# Patient Record
Sex: FEMALE | Race: WHITE
[De-identification: names, ages, dates, MRNs, and addresses within clinical notes are randomized per-mention and may not be internally consistent; named-entity substitution may affect disease eponyms.]

---

## 2019-03-27 ENCOUNTER — HOSPITAL ENCOUNTER (INPATIENT)
Dept: HOSPITAL 89 - OR | Age: 54
LOS: 8 days | Discharge: HOME HEALTH | DRG: 460 | End: 2019-04-04
Attending: ORTHOPAEDIC SURGERY | Admitting: ORTHOPAEDIC SURGERY
Payer: OTHER GOVERNMENT

## 2019-03-27 VITALS — SYSTOLIC BLOOD PRESSURE: 70 MMHG | DIASTOLIC BLOOD PRESSURE: 39 MMHG

## 2019-03-27 VITALS — DIASTOLIC BLOOD PRESSURE: 63 MMHG | SYSTOLIC BLOOD PRESSURE: 84 MMHG

## 2019-03-27 VITALS — DIASTOLIC BLOOD PRESSURE: 56 MMHG | SYSTOLIC BLOOD PRESSURE: 87 MMHG

## 2019-03-27 VITALS — DIASTOLIC BLOOD PRESSURE: 68 MMHG | SYSTOLIC BLOOD PRESSURE: 102 MMHG

## 2019-03-27 VITALS
WEIGHT: 215 LBS | BODY MASS INDEX: 42.21 KG/M2 | HEIGHT: 60 IN | HEIGHT: 60 IN | WEIGHT: 215 LBS | BODY MASS INDEX: 42.21 KG/M2

## 2019-03-27 VITALS — SYSTOLIC BLOOD PRESSURE: 107 MMHG | DIASTOLIC BLOOD PRESSURE: 73 MMHG

## 2019-03-27 VITALS — DIASTOLIC BLOOD PRESSURE: 73 MMHG | SYSTOLIC BLOOD PRESSURE: 105 MMHG

## 2019-03-27 VITALS — SYSTOLIC BLOOD PRESSURE: 95 MMHG | DIASTOLIC BLOOD PRESSURE: 63 MMHG

## 2019-03-27 VITALS — DIASTOLIC BLOOD PRESSURE: 69 MMHG | SYSTOLIC BLOOD PRESSURE: 101 MMHG

## 2019-03-27 VITALS — DIASTOLIC BLOOD PRESSURE: 68 MMHG | SYSTOLIC BLOOD PRESSURE: 103 MMHG

## 2019-03-27 VITALS — SYSTOLIC BLOOD PRESSURE: 96 MMHG | DIASTOLIC BLOOD PRESSURE: 54 MMHG

## 2019-03-27 VITALS — DIASTOLIC BLOOD PRESSURE: 64 MMHG | SYSTOLIC BLOOD PRESSURE: 95 MMHG

## 2019-03-27 VITALS — DIASTOLIC BLOOD PRESSURE: 57 MMHG | SYSTOLIC BLOOD PRESSURE: 87 MMHG

## 2019-03-27 VITALS — SYSTOLIC BLOOD PRESSURE: 93 MMHG | DIASTOLIC BLOOD PRESSURE: 66 MMHG

## 2019-03-27 DIAGNOSIS — M48.062: Primary | ICD-10-CM

## 2019-03-27 DIAGNOSIS — E78.5: ICD-10-CM

## 2019-03-27 DIAGNOSIS — Z79.84: ICD-10-CM

## 2019-03-27 DIAGNOSIS — M54.17: ICD-10-CM

## 2019-03-27 DIAGNOSIS — G89.29: ICD-10-CM

## 2019-03-27 DIAGNOSIS — Z88.5: ICD-10-CM

## 2019-03-27 DIAGNOSIS — E66.01: ICD-10-CM

## 2019-03-27 DIAGNOSIS — R30.0: ICD-10-CM

## 2019-03-27 DIAGNOSIS — G25.81: ICD-10-CM

## 2019-03-27 DIAGNOSIS — I10: ICD-10-CM

## 2019-03-27 DIAGNOSIS — E11.9: ICD-10-CM

## 2019-03-27 DIAGNOSIS — M54.16: ICD-10-CM

## 2019-03-27 DIAGNOSIS — M48.07: ICD-10-CM

## 2019-03-27 PROCEDURE — 36415 COLL VENOUS BLD VENIPUNCTURE: CPT

## 2019-03-27 PROCEDURE — 97161 PT EVAL LOW COMPLEX 20 MIN: CPT

## 2019-03-27 PROCEDURE — 82247 BILIRUBIN TOTAL: CPT

## 2019-03-27 PROCEDURE — 84155 ASSAY OF PROTEIN SERUM: CPT

## 2019-03-27 PROCEDURE — 0SG1071 FUSION OF 2 OR MORE LUMBAR VERTEBRAL JOINTS WITH AUTOLOGOUS TISSUE SUBSTITUTE, POSTERIOR APPROACH, POSTERIOR COLUMN, OPEN APPROACH: ICD-10-PCS | Performed by: ORTHOPAEDIC SURGERY

## 2019-03-27 PROCEDURE — 82310 ASSAY OF CALCIUM: CPT

## 2019-03-27 PROCEDURE — 86850 RBC ANTIBODY SCREEN: CPT

## 2019-03-27 PROCEDURE — 85014 HEMATOCRIT: CPT

## 2019-03-27 PROCEDURE — 01NB0ZZ RELEASE LUMBAR NERVE, OPEN APPROACH: ICD-10-PCS | Performed by: ORTHOPAEDIC SURGERY

## 2019-03-27 PROCEDURE — 36416 COLLJ CAPILLARY BLOOD SPEC: CPT

## 2019-03-27 PROCEDURE — 84460 ALANINE AMINO (ALT) (SGPT): CPT

## 2019-03-27 PROCEDURE — 82374 ASSAY BLOOD CARBON DIOXIDE: CPT

## 2019-03-27 PROCEDURE — 87088 URINE BACTERIA CULTURE: CPT

## 2019-03-27 PROCEDURE — 84450 TRANSFERASE (AST) (SGOT): CPT

## 2019-03-27 PROCEDURE — 86900 BLOOD TYPING SEROLOGIC ABO: CPT

## 2019-03-27 PROCEDURE — 84520 ASSAY OF UREA NITROGEN: CPT

## 2019-03-27 PROCEDURE — 84132 ASSAY OF SERUM POTASSIUM: CPT

## 2019-03-27 PROCEDURE — 82040 ASSAY OF SERUM ALBUMIN: CPT

## 2019-03-27 PROCEDURE — 82948 REAGENT STRIP/BLOOD GLUCOSE: CPT

## 2019-03-27 PROCEDURE — 86901 BLOOD TYPING SEROLOGIC RH(D): CPT

## 2019-03-27 PROCEDURE — 84295 ASSAY OF SERUM SODIUM: CPT

## 2019-03-27 PROCEDURE — 82565 ASSAY OF CREATININE: CPT

## 2019-03-27 PROCEDURE — 95940 IONM IN OPERATNG ROOM 15 MIN: CPT

## 2019-03-27 PROCEDURE — 82435 ASSAY OF BLOOD CHLORIDE: CPT

## 2019-03-27 PROCEDURE — 82947 ASSAY GLUCOSE BLOOD QUANT: CPT

## 2019-03-27 PROCEDURE — 0SG3071 FUSION OF LUMBOSACRAL JOINT WITH AUTOLOGOUS TISSUE SUBSTITUTE, POSTERIOR APPROACH, POSTERIOR COLUMN, OPEN APPROACH: ICD-10-PCS | Performed by: ORTHOPAEDIC SURGERY

## 2019-03-27 PROCEDURE — 84075 ASSAY ALKALINE PHOSPHATASE: CPT

## 2019-03-27 PROCEDURE — 81001 URINALYSIS AUTO W/SCOPE: CPT

## 2019-03-27 PROCEDURE — 76000 FLUOROSCOPY <1 HR PHYS/QHP: CPT

## 2019-03-27 PROCEDURE — 85018 HEMOGLOBIN: CPT

## 2019-03-27 RX ADMIN — CEFAZOLIN SODIUM SCH MLS/HR: 2 SOLUTION INTRAVENOUS at 17:57

## 2019-03-27 RX ADMIN — HYDROCODONE BITARTRATE AND ACETAMINOPHEN PRN EACH: 5; 325 TABLET ORAL at 19:48

## 2019-03-27 RX ADMIN — OXYCODONE HYDROCHLORIDE PRN MG: 5 TABLET ORAL at 21:56

## 2019-03-27 RX ADMIN — OXYCODONE HYDROCHLORIDE PRN MG: 5 TABLET ORAL at 17:31

## 2019-03-27 RX ADMIN — HYDROMORPHONE HYDROCHLORIDE PRN MG: 2 INJECTION, SOLUTION INTRAMUSCULAR; INTRAVENOUS; SUBCUTANEOUS at 17:31

## 2019-03-27 RX ADMIN — ATORVASTATIN CALCIUM SCH MG: 40 TABLET, FILM COATED ORAL at 21:39

## 2019-03-27 RX ADMIN — CETIRIZINE HYDROCHLORIDE SCH MG: 10 TABLET, FILM COATED ORAL at 21:38

## 2019-03-27 RX ADMIN — DOCUSATE SODIUM SCH MG: 100 CAPSULE, LIQUID FILLED ORAL at 21:39

## 2019-03-27 RX ADMIN — AMITRIPTYLINE HYDROCHLORIDE SCH MG: 25 TABLET, FILM COATED ORAL at 21:39

## 2019-03-27 NOTE — OPERATIVE REPORT 1
EVENT DATE:  March 27, 2019 

SURGEON:  Reggie Miguel MD 

ANESTHESIOLOGIST:  Jin Guevara MD 

ANESTHESIA:  General endotracheal anesthesia.

ASSISTANT:  Kenn Ayers PA-C 





PREOPERATIVE DIAGNOSES  

1.  L2 to S1 spinal stenosis with flat back.

2.  Radiculopathy.

3.  Neurogenic claudication.



POSTOPERATIVE DIAGNOSES 

1.  L2 to S1 spinal stenosis with flat back.

2.  Radiculopathy.

3.  Neurogenic claudication.



PROCEDURE PERFORMED 

L2 to S1 revision laminectomy with L3 to S1 placement of pedicle screws and 

posterolateral instrumentation.  



INTRAVENOUS FLUIDS 

2400 mL.



ESTIMATED BLOOD LOSS 

200 mL.



IMPLANTS USED 

Reline pedicle screws 6.5 mm x 40 mm from NuVasive times eight, 90 mm connecting

rods from NuVasive times two, and locking caps from NuVasive times eight, plus 

ViBone 5 cc as a bone graft extender.  



SPECIMENS

None.



DRAINS

None.



COMPLICATIONS

None.



DISPOSITION

Post-anesthesia care unit.



INDICATIONS FOR SURGERY

Ms. Sanchez is a 53-year-old female who presented with the chief complaint of low 

back and radiating right leg pain.  The majority of her pain was below the belt,

radiating down the posterior buttock, posterolateral thigh, and lateral calf 

into the dorsum of the right foot.  She had weakness in both lower extremities 

and a significant decrease in walking tolerance secondary to heaviness and 

tiredness in her legs.  She had had previous surgery of the spine in the past.  

Her physical examination was significant for no extension of the spine beyond 

neutral, but flexion was to 90.  She had negative straight leg raise tests 

bilaterally, and lower extremity strength was 5/5 throughout with light touch 

sensation diminished in the saphenous nerve distributions and with dysesthesias 

in the deep peroneal nerve distribution on the right.  She had imaging studies 

that showed straightening of the lumbar lordosis with a slight anterolisthesis 

of L3 on L4 and L4 on L5.  She had a bit of a dextroconvex degenerative 

scoliosis centered on L2 and severe degenerative disk disease at L2-L3, L3-L4, 

and L4-L5 with severe stenosis at those levels.  Secondary to ongoing symptoms 

and failure of nonsurgical care, Ms. Sanchez was offered and elected to undergo 

revision lumbar laminectomy and posterolateral instrumented fusion.  We 

initially planned to perform interbody device placement at L3-L4, L4-L5, and L5-

S1 because of concern for potential soft bone, but in the end, this was not 

performed because her bone was, indeed, very hard, and the pedicle screw 

purchase was some of the best I have ever encountered.  



Prior to surgery, I explained in detail to the patient the possible risks of 

surgery.  These risks include bleeding, infection, damage to surrounding 

structures, nerve root injury, spinal fluid leak, meningitis, persistent and/or 

worsening pain, need for further surgery, death, blindness, sexual dysfunction, 

autonomic nervous system dysfunction, and other unforeseen medical and surgical 

complications.  An understanding that in general spinal surgery is more 

predictive at improving extremity discomfort than axial spine pain was stressed.

 Further understanding that given the revision nature of this procedure, 

potential risks were significantly increased was also stressed prior to surgery.

 



DESCRIPTION OF PROCEDURE

On the day of surgery, the patient was met in the preoperative hold area, and 

all questions were answered.  The operative site was identified and marked by 

myself.  The patient was brought in good condition to the operating room, and 

after succumbing to anesthesia, was positioned in the prone position on a 

Yo table.  All bony protuberances and soft tissues were well padded in the 

standard fashion.  Care was taken to maintain appropriate perfusion pressures 

during anesthesia.  Preoperative antibiotics were administered according to the 

appropriate timing schedule.  At the conclusion of the procedure, sponge and 

needle counts were correct times two.  



Final timeout was undertaken by members of the operating team to confirm correct

patient, correct levels, and correct surgery.  The patient was then prepped and 

draped in the standard sterile orthopedic fashion, and a vertical incision was 

made overlying the intended surgical levels.  Sharp dissection was carried down 

to the posterior elements, specifically the spinous process of L2 and the 

spinous process of L5.  Spinous processes of L3 and L4 were no longer present 

secondary to her previous spine surgery.  At the L2 and the L5 spinous 

processes, I was able to elevate soft tissues off those spinous processes and 

laminae in a subperiosteal manner.  I then used electrocautery,  directing my 

dissection away from the dura and out towards the facet joints and came down 

upon the facet joints of L3-L4, L4-L5, and L5-S1 with the electrocautery.  I was

then able to go over to the lateral aspect of those facets also and also at L2-

L3 and come down on the transverse processes of L3, L4, L5, as well as the 

sacral ala.  This was accomplished bilaterally, and soft tissues were elevated 

off the transverse processes out to the tips.  Bleeding was controlled, and 

thrombin-soaked sponges were packed in those lateral gutters to maintain space 

for later placement of screws and to assist with hemostasis.  



Attention was then turned to the decompression portion of the procedure.  I 

started with the L2 lamina and removed that with a Leksell rongeur and then 

thinned it down the midline with a high-speed elysia.  I was able to enter the 

canal by undermining the superior insertion of the scar tissue and reconstituted

ligamentum flavum from the inferior aspect of the L2 lamina.  I used a Alpaugh 

elevator to separate any dural adhesions from surrounding bone and soft tissue 

prior to using a Kerrison punch to complete a midline decompression of the L2 

lamina.  I then encountered significant scar trying to go inferiorly, and so I 

elected to try to attack the decompression from down below.  



I removed the L5 spinous process with a Leksell rongeur and then thinned the 

lamina with a high-speed elysia and the Leksell.  I was able to enter the canal 

inferiorly by undermining the superior insertion of the ligamentum flavum from 

the inferior aspect of the L5 lamina.  Again, I used a Monster elevator to 

separate any dural adhesions from surrounding bone and soft tissue prior to 

using the Kerrison punch.  I then carefully made my way from a caudad to 

cephalad direction, performing a midline decompression with a combination of #3 

and #4 Kerrison rongeurs as well as a Leksell rongeur to take down the pre-

existing scar tissue which was quite robust in the midline.  While doing this, I

noted that the laminae were actually present at both L3 and L4, and there was 

really no evidence of much of a decompressive procedure having been performed.  

Apart from the presence of the significant scar tissue and the absence of 

spinous processes, I was unable to completely ascertain exactly what surgery was

performed previously.  



Nonetheless, I was able to gradually work my way cephalad and ultimately was 

able to connect the inferior laminectomy defect with the laminectomy defect that

I had created by taking down the lamina of L2.  There was significant deviation 

to the dura at the L2-L3 and L3-L4 levels secondary to severely thickened 

ligamentum flavum and overgrown facet joints.  I gradually was able to free the 

dura up from surrounding bone and soft tissue and ultimately was able to perform

bilateral lateral recess decompressions again using #3 and #4 Kerrison rongeurs.

 Ultimately, I was able to free up all of the nerve roots, and a Monster 

elevator was passed in the lateral gutters to ensure that there was no 

significant persistent lateral recess stenosis.  The Alpaugh was also passed out

the involved formina, again noting no severe stenosis was present.  



Attention was then turned to placement of pedicle screws.  A 5 mm high-speed 

elysia was used to decorticate the bone overlying the starting point for pedicle 

screws bilaterally at L3, L4, L5, and S1.  The starting point was identified at 

approximately the junction of the pars interarticularis, the mid point of the 

transverse process, and the lateral aspect of the facet joint.  Once 

decortication was complete, a Lenke style probe was advanced against resistance 

through the isthmus of the pedicle and into the vertebral body.  A ball-tipped 

feeler was used to palpate the pedicle for any potential breeches, checking 

superiorly, inferiorly, medially, and laterally, as well as distally to ensure 

no bony breaching was present.  Pedicle screws 6.5 mm x 40 mm were selected for 

all eight pedicles, and the bony purchase was robust.  All screws were tested 

with neurophysiologic monitoring, and they all tested well within acceptable 

limits.  



Connecting rods 90 mm were selected, and the Portuguese bains was used to contour 

them to the appropriate lumbar lordosis.  These were laid into the tulips of the

pedicle screws and locked in place with locking caps, which were tightened and 

then finally tightened using the final tightening torque wrench.  AP and lateral

radiographs were obtained which showed excellent positioning of all screws and 

rods and restoration of the lumbar lordosis.  



The wound was then irrigated with 4 L of sterile saline solution, and we then 

used the high-speed elysia to decorticate the transverse processes of L3, L4, and 

L5, as well as the superior aspect of the sacral ala bilaterally.  We then 

packed a combination of ViBone and harvested local bone into those lateral 

gutters ensuring that we had bone overlying the transverse processes of L3, L4, 

L5, and over the decorticated sacral ala bilaterally.  Retractors were then 

removed, and hemostasis was obtained.  The wound was closed in layers using a 

running stitch for the deep fascia and inverted interrupted stitch for the 

subcutaneous tissue and then staples for the skin per the patient's request.  

Sponge and needle counts were correct times two.



POSTOPERATIVE CARE PLAN 

The patient will remain in the hospital until she meets discharge criteria.  She

will be discharged home with instructions to follow up in approximately two 

weeks for wound check and examination.  

TABITHA

## 2019-03-27 NOTE — RADIOLOGY IMAGING REPORT
FACILITY: [facility] 

 

PATIENT NAME: Kathia Sanchez

: 1965

MR: [id number]

V: [id number]

EXAM DATE: 

ORDERING PHYSICIAN: ARIAS JAY

TECHNOLOGIST: 

 

Location: [facility]

Patient: Kathia Sanchez

: 1965

MRN: [id number]

Visit/Account:[id number]

Date of Sevice:  3/27/2019

 

ACCESSION #: [id number]

 

C-ARM FLUORO 1 HR

 

HISTORY:  L3-S1, DISC HERNIATION/FUSION

 

COMPARISON:  None.

 

FINDINGS:

 

Three intraoperative fluoroscopic images demonstrate the placement of pedicle screws in L3-S1 vertebr
al bodies secured with rods.  Laminectomies involve at least L4 and L5.  Hardware appears appropriate
ly positioned.

 

Fluoroscopy time: 8.4 seconds

 

Dose:

DAP: 1.2 Gy-cm2

 

IMPRESSION:

 

Intraoperative PLIF L3-4 through L5-S1 unremarkable in appearance.

 

Report Dictated By: Danielito García MD at 3/27/2019 3:03 PM

 

Report E-Signed By: Danielito García MD  at 3/27/2019 3:06 PM

 

WSN:[id number]

## 2019-03-27 NOTE — HOSPITALIST CONSULTATION
History of Present Illness


Requesting Physician


Dr Miguel


Reason for Consult


Management of medical comorbidities


Chief Complaint


spinal stenosis


History of Present Illness


53F admitted after spinal surgery. PMHx significant for HTN, DM. Tolerated 


procedure well. Will resume home medications other than BP medication as BP is 


low.





History


Problems:  


(1) DM (diabetes mellitus)


(2) HTN (hypertension)


Home Meds


Reported Medications


Aspirin (ASPIR 81) 81 Mg Tablet.dr, 81 MG PO QPM, TAB


   3/27/19


Ferrous Sulfate, Dried (IRON) 160 Mg Tablet.er, 160 MG PO QDAY


   3/7/19


Calcium Carbonate (CALCIUM) 500 Mg Tablet, 500 MG PO QDAY


   3/7/19


Omega-3S/Dha/Epa/Fish Oil/D3 (FISH OIL + D3 SOFTGEL) 1 Each Capsule, 1 EACH PO 


QID, CAPSULE


   3/7/19


Exenatide Microspheres (Bydureon Pen) 2 Mg/0.65 Ml Pen.injctr, SQ QWEEK


   3/7/19


Insulin Glargine (LANTUS) 100 Unit/Ml Soln, 30 UNIT SUBQ BID, ML


   3/7/19


Amitriptyline Hcl (AMITRIPTYLINE HCL) 25 Mg Tablet, 25 MG PO BID, #5 TAB


   3/7/19


Cetirizine Hcl (ZYRTEC) 10 Mg Capsule, 10 MG PO HS, CAPSULE


   3/7/19


Lisinopril (LISINOPRIL) 20 Mg Tablet, 20 MG PO QDAY, TAB


   3/7/19


Pramipexole Di-Hcl (MIRAPEX) 1 Mg Tablet, 1 MG PO HS


   3/7/19


Spironolactone (SPIRONOLACTONE) 25 Mg Tablet, 25 MG PO QDAY, TAB


   3/7/19


Cyclobenzaprine Hcl (CYCLOBENZAPRINE HCL) 10 Mg Tablet, 10 MG PO HS, #9 TAB


   3/7/19


Propranolol Hcl (PROPRANOLOL HCL) 10 Mg Tablet, 10 MG PO BID


   3/7/19


Sitagliptin Phosphate (JANUVIA) 100 Mg Tablet, 100 MG PO QDAY


   3/7/19


Atorvastatin Calcium (LIPITOR) 40 Mg Tablet, 1 TAB PO HS, TAB


   3/7/19


Gabapentin (GABAPENTIN) 300 Mg Capsule, 600 MG PO TID, CAPSULE


   3/7/19


Metformin Hcl (METFORMIN HCL) 1,000 Mg Tablet, 1 TAB PO BID, TAB


   3/7/19


Discontinued Reported Medications


Cyanocobalamin/Folic Acid (VITAMIN B12-FOLIC ACID TABLET) 1 Each Tablet, 1 EACH 


PO QDAY


   3/7/19


Aspirin (ECOTRIN) 325 Mg Tablet.dr, 81 MG PO QDAY


   3/7/19


Allergies:  


Coded Allergies:  


     walnut (Verified  Allergy, Severe, ANAPHYLAXIS , 3/7/19)


     codeine (Verified  Adverse Reaction, Mild, PAIN IN UPPER STOMACH, 3/7/19)


     hydromorphone (Verified  Adverse Reaction, Mild, MIGRAINES , 3/7/19)


Patient History:  


Diabetes mellitus (DM)


  MOTHER


FHx: hypertension


  MOTHER


Hx Smoking:  No


Caffeine Intake:  Soda


Caffeine/Cups Per Day:  2-3 CANS/DAY


Hx Alcohol Use:  No


Social Drug Use:  Never


History of IV Drug Use:  No





Review of Systems


All Systems Reviewed/Normal:  Yes, Except as Noted


Gastrointestinal:  No Nausea, No Vomiting





Exam


Vital Signs





Vital Signs








  Date Time  Temp Pulse Resp B/P (MAP) Pulse Ox O2 Delivery O2 Flow Rate FiO2


 


3/27/19 19:30 96.8 82 14 95/64 (74) 96 Nasal Cannula 2.0 








General Appearance:  Alert, Awake, No Acute Distress, Afebrile


Neuro:  No Gross deficits


ENT:  Normal


Cardiovascular:  Normal Rhythm & Peripheral Pulses


Respiratory:  No Respiratory Distress


Extremities:  Soft and Non Tender, Warm, Pulses, Perfused


Integumentary:  Skin Intact without Lesion / Mass





Assessment and Plan


Problems:  


(1) Spinal stenosis


Assessment & Plan:  Post operative, management per primary.





(2) HTN (hypertension)


Assessment & Plan:  Hold home lisinopril, spironolactone. Resume if BP warrants.





(3) DM (diabetes mellitus)


Assessment & Plan:  Decrease insulin to 20U Lantus BID, accuchecks achs, SSI 


level 1. Hold metformin and Januvia while inpatient.








Venous Thromboembolism


Antithrombotics


Is Pt On Any Antithrombotics?:  Yes





Exam


Sepsis Risk:  No Definite Risk











HOGUE MANGO CORTES DO       Mar 27, 2019 21:06

## 2019-03-28 VITALS — DIASTOLIC BLOOD PRESSURE: 69 MMHG | SYSTOLIC BLOOD PRESSURE: 103 MMHG

## 2019-03-28 VITALS — SYSTOLIC BLOOD PRESSURE: 111 MMHG | DIASTOLIC BLOOD PRESSURE: 69 MMHG

## 2019-03-28 VITALS — SYSTOLIC BLOOD PRESSURE: 75 MMHG | DIASTOLIC BLOOD PRESSURE: 55 MMHG

## 2019-03-28 VITALS — DIASTOLIC BLOOD PRESSURE: 67 MMHG | SYSTOLIC BLOOD PRESSURE: 116 MMHG

## 2019-03-28 VITALS — SYSTOLIC BLOOD PRESSURE: 85 MMHG | DIASTOLIC BLOOD PRESSURE: 56 MMHG

## 2019-03-28 VITALS — SYSTOLIC BLOOD PRESSURE: 102 MMHG | DIASTOLIC BLOOD PRESSURE: 66 MMHG

## 2019-03-28 VITALS — DIASTOLIC BLOOD PRESSURE: 58 MMHG | SYSTOLIC BLOOD PRESSURE: 92 MMHG

## 2019-03-28 VITALS — SYSTOLIC BLOOD PRESSURE: 97 MMHG | DIASTOLIC BLOOD PRESSURE: 60 MMHG

## 2019-03-28 VITALS — SYSTOLIC BLOOD PRESSURE: 88 MMHG | DIASTOLIC BLOOD PRESSURE: 55 MMHG

## 2019-03-28 VITALS — SYSTOLIC BLOOD PRESSURE: 132 MMHG | DIASTOLIC BLOOD PRESSURE: 74 MMHG

## 2019-03-28 RX ADMIN — CEFAZOLIN SODIUM SCH MLS/HR: 2 SOLUTION INTRAVENOUS at 09:01

## 2019-03-28 RX ADMIN — DIAZEPAM PRN MG: 5 TABLET ORAL at 09:03

## 2019-03-28 RX ADMIN — AMITRIPTYLINE HYDROCHLORIDE SCH MG: 25 TABLET, FILM COATED ORAL at 09:03

## 2019-03-28 RX ADMIN — HYDROCODONE BITARTRATE AND ACETAMINOPHEN PRN EACH: 5; 325 TABLET ORAL at 05:31

## 2019-03-28 RX ADMIN — INSULIN GLARGINE SCH UNIT: 100 INJECTION, SOLUTION SUBCUTANEOUS at 21:45

## 2019-03-28 RX ADMIN — DIAZEPAM PRN MG: 5 TABLET ORAL at 16:21

## 2019-03-28 RX ADMIN — MAGNESIUM HYDROXIDE PRN ML: 400 SUSPENSION ORAL at 15:35

## 2019-03-28 RX ADMIN — INSULIN LISPRO PRN UNIT: 100 INJECTION, SOLUTION INTRAVENOUS; SUBCUTANEOUS at 17:51

## 2019-03-28 RX ADMIN — AMITRIPTYLINE HYDROCHLORIDE SCH MG: 25 TABLET, FILM COATED ORAL at 21:45

## 2019-03-28 RX ADMIN — OXYCODONE HYDROCHLORIDE PRN MG: 5 TABLET ORAL at 23:02

## 2019-03-28 RX ADMIN — OXYCODONE HYDROCHLORIDE PRN MG: 5 TABLET ORAL at 03:54

## 2019-03-28 RX ADMIN — OXYCODONE HYDROCHLORIDE PRN MG: 5 TABLET ORAL at 19:14

## 2019-03-28 RX ADMIN — HYDROCODONE BITARTRATE AND ACETAMINOPHEN PRN EACH: 5; 325 TABLET ORAL at 00:33

## 2019-03-28 RX ADMIN — PROPRANOLOL HYDROCHLORIDE SCH MG: 20 TABLET ORAL at 21:00

## 2019-03-28 RX ADMIN — DOCUSATE SODIUM SCH MG: 100 CAPSULE, LIQUID FILLED ORAL at 09:03

## 2019-03-28 RX ADMIN — ATORVASTATIN CALCIUM SCH MG: 40 TABLET, FILM COATED ORAL at 21:44

## 2019-03-28 RX ADMIN — HYDROMORPHONE HYDROCHLORIDE PRN MG: 2 INJECTION, SOLUTION INTRAMUSCULAR; INTRAVENOUS; SUBCUTANEOUS at 21:51

## 2019-03-28 RX ADMIN — CETIRIZINE HYDROCHLORIDE SCH MG: 10 TABLET, FILM COATED ORAL at 21:44

## 2019-03-28 RX ADMIN — HYDROMORPHONE HYDROCHLORIDE PRN MG: 2 INJECTION, SOLUTION INTRAMUSCULAR; INTRAVENOUS; SUBCUTANEOUS at 16:45

## 2019-03-28 RX ADMIN — CALCIUM SCH MG: 500 TABLET ORAL at 09:03

## 2019-03-28 RX ADMIN — CEFAZOLIN SODIUM SCH MLS/HR: 2 SOLUTION INTRAVENOUS at 00:33

## 2019-03-28 RX ADMIN — HYDROCODONE BITARTRATE AND ACETAMINOPHEN PRN EACH: 5; 325 TABLET ORAL at 17:17

## 2019-03-28 RX ADMIN — DOCUSATE SODIUM SCH MG: 100 CAPSULE, LIQUID FILLED ORAL at 21:44

## 2019-03-28 RX ADMIN — PRAMIPEXOLE DIHYDROCHLORIDE SCH MG: 0.25 TABLET ORAL at 21:44

## 2019-03-28 RX ADMIN — INSULIN LISPRO PRN UNIT: 100 INJECTION, SOLUTION INTRAVENOUS; SUBCUTANEOUS at 21:46

## 2019-03-28 RX ADMIN — HYDROCODONE BITARTRATE AND ACETAMINOPHEN PRN EACH: 5; 325 TABLET ORAL at 13:13

## 2019-03-28 RX ADMIN — INSULIN GLARGINE SCH UNIT: 100 INJECTION, SOLUTION SUBCUTANEOUS at 09:01

## 2019-03-28 RX ADMIN — HYDROCODONE BITARTRATE AND ACETAMINOPHEN PRN EACH: 5; 325 TABLET ORAL at 09:02

## 2019-03-28 RX ADMIN — HYDROCODONE BITARTRATE AND ACETAMINOPHEN PRN EACH: 5; 325 TABLET ORAL at 21:17

## 2019-03-28 NOTE — NUR
Physical Therapy Impression



PT eval complete. Pt reports pain and dysfunction of the L) LE at rest. PT

instruction for

bed mobility and log roll technique, pt requires moderate

assistance to move L) LE and move hips to the EOB. Once sitting at EOB, pt

reported lightheadedness. Pt's BP was 81/42 when supine, this dropped to

72/54 when seated at EOB with pt reporting symptoms. RN informed of pt's

tolerance to PT session. D/t dysfunction of the L) LE and high level of

independence needed to d/c home, rec acute rehab when medically

appropriate.





Physical Therapy Goals



1: Pt to complete bed mobility wtih SBA

2: Pt to complete transfers with SBA and appropriate AD

3: Pt to ambulate 150' with SBA and appropriate AD

4: Pt to asc/desc 6 stairs with railing and SBA

5: Pt to I)ly derrek/doff brace.



Patient's Goals

## 2019-03-28 NOTE — HOSPITALIST PROGRESS NOTE
Subjective


Progress Notes


Subjective


She was admitted s/p lumbar fusion. She reports being lightheaded this morning. 


She also would like to take her RLS medication.





Patient Complains of:


Cardiovascular:  No: Chest Pain


Respiratory:  No: Shortness of Breath





Physical Exam





Vital Signs








  Date Time  Temp Pulse Resp B/P (MAP) Pulse Ox O2 Delivery O2 Flow Rate FiO2


 


3/28/19 09:03     98 Nasal Cannula 0.5 


 


3/28/19 08:15  93 16 85/56 (66)    


 


3/28/19 03:21 98.3       














Intake and Output 


 


 3/28/19





 07:00


 


Intake Total 4900 ml


 


Output Total 500 ml


 


Balance 4400 ml


 


 


 


Intake Oral 550 ml


 


IV Total 3900 ml


 


Other 450 ml


 


Output Urine Total 500 ml


 


# Voids 4








General Appearance:  Alert, Awake, No Acute Distress, Afebrile


Neuro:  Other (right leg appears hyperactive, left lower extremity weak)


Cardiovascular:  Regular Rate and Rhythm


Respiratory:  No Respiratory Distress, Clear to Auscultation


GI:  Soft and Non-Tender


Extremities:  Warm, Perfused, Edema (non pitting edema bilaterally)


Psych:  Alert & Oriented X3, Appropriate Mood & Affect


Result Diagram:  


3/28/19 0825                                                                    


           3/28/19 0825








Assessment and Plan


Problems:  


(1) Spinal stenosis


Assessment & Plan:  Post operative, management per primary.





(2) HTN (hypertension)


Assessment & Plan:  Hold home lisinopril, spironolactone. Will continue to hold 


BP medications. She will receive IV fluids today to help with hypotension and 


lightheadedness. 





(3) DM (diabetes mellitus)


Assessment & Plan:  Decrease insulin to 10U Lantus BID, accuchecks achs, SSI 


level 2. Hold metformin and Januvia while inpatient.





(4) RLS (restless legs syndrome)


Status:  Chronic


Assessment & Plan:  Her chronic Mirapex will be resumed today. 








Exam


Sepsis Risk:  No Definite Risk











JONG PANDYA FNP            Mar 28, 2019 10:26

## 2019-03-29 VITALS — SYSTOLIC BLOOD PRESSURE: 111 MMHG | DIASTOLIC BLOOD PRESSURE: 65 MMHG

## 2019-03-29 VITALS — SYSTOLIC BLOOD PRESSURE: 107 MMHG | DIASTOLIC BLOOD PRESSURE: 50 MMHG

## 2019-03-29 VITALS — SYSTOLIC BLOOD PRESSURE: 89 MMHG | DIASTOLIC BLOOD PRESSURE: 50 MMHG

## 2019-03-29 VITALS — DIASTOLIC BLOOD PRESSURE: 54 MMHG | SYSTOLIC BLOOD PRESSURE: 91 MMHG

## 2019-03-29 VITALS — SYSTOLIC BLOOD PRESSURE: 88 MMHG | DIASTOLIC BLOOD PRESSURE: 49 MMHG

## 2019-03-29 VITALS — SYSTOLIC BLOOD PRESSURE: 89 MMHG | DIASTOLIC BLOOD PRESSURE: 58 MMHG

## 2019-03-29 RX ADMIN — HYDROCODONE BITARTRATE AND ACETAMINOPHEN PRN EACH: 5; 325 TABLET ORAL at 19:43

## 2019-03-29 RX ADMIN — DIAZEPAM PRN MG: 5 TABLET ORAL at 14:22

## 2019-03-29 RX ADMIN — CETIRIZINE HYDROCHLORIDE SCH MG: 10 TABLET, FILM COATED ORAL at 20:43

## 2019-03-29 RX ADMIN — INSULIN LISPRO PRN UNIT: 100 INJECTION, SOLUTION INTRAVENOUS; SUBCUTANEOUS at 16:59

## 2019-03-29 RX ADMIN — INSULIN LISPRO PRN UNIT: 100 INJECTION, SOLUTION INTRAVENOUS; SUBCUTANEOUS at 08:03

## 2019-03-29 RX ADMIN — OXYCODONE HYDROCHLORIDE PRN MG: 5 TABLET ORAL at 05:41

## 2019-03-29 RX ADMIN — DIAZEPAM PRN MG: 5 TABLET ORAL at 08:02

## 2019-03-29 RX ADMIN — INSULIN LISPRO PRN UNIT: 100 INJECTION, SOLUTION INTRAVENOUS; SUBCUTANEOUS at 20:51

## 2019-03-29 RX ADMIN — ATORVASTATIN CALCIUM SCH MG: 40 TABLET, FILM COATED ORAL at 20:43

## 2019-03-29 RX ADMIN — PROPRANOLOL HYDROCHLORIDE SCH MG: 20 TABLET ORAL at 11:11

## 2019-03-29 RX ADMIN — OXYCODONE HYDROCHLORIDE PRN MG: 5 TABLET ORAL at 10:04

## 2019-03-29 RX ADMIN — OXYCODONE HYDROCHLORIDE PRN MG: 5 TABLET ORAL at 02:29

## 2019-03-29 RX ADMIN — HYDROCODONE BITARTRATE AND ACETAMINOPHEN PRN EACH: 5; 325 TABLET ORAL at 14:22

## 2019-03-29 RX ADMIN — HYDROCODONE BITARTRATE AND ACETAMINOPHEN PRN EACH: 5; 325 TABLET ORAL at 01:36

## 2019-03-29 RX ADMIN — DIAZEPAM PRN MG: 5 TABLET ORAL at 19:43

## 2019-03-29 RX ADMIN — AMITRIPTYLINE HYDROCHLORIDE SCH MG: 25 TABLET, FILM COATED ORAL at 20:44

## 2019-03-29 RX ADMIN — OXYCODONE HYDROCHLORIDE PRN MG: 5 TABLET ORAL at 17:04

## 2019-03-29 RX ADMIN — INSULIN LISPRO PRN UNIT: 100 INJECTION, SOLUTION INTRAVENOUS; SUBCUTANEOUS at 11:36

## 2019-03-29 RX ADMIN — INSULIN GLARGINE SCH UNIT: 100 INJECTION, SOLUTION SUBCUTANEOUS at 20:49

## 2019-03-29 RX ADMIN — HYDROCODONE BITARTRATE AND ACETAMINOPHEN PRN EACH: 5; 325 TABLET ORAL at 08:02

## 2019-03-29 RX ADMIN — PRAMIPEXOLE DIHYDROCHLORIDE SCH MG: 0.25 TABLET ORAL at 20:44

## 2019-03-29 RX ADMIN — MAGNESIUM HYDROXIDE PRN ML: 400 SUSPENSION ORAL at 08:01

## 2019-03-29 RX ADMIN — PROPRANOLOL HYDROCHLORIDE SCH MG: 20 TABLET ORAL at 08:01

## 2019-03-29 RX ADMIN — AMITRIPTYLINE HYDROCHLORIDE SCH MG: 25 TABLET, FILM COATED ORAL at 08:02

## 2019-03-29 RX ADMIN — PROPRANOLOL HYDROCHLORIDE SCH MG: 20 TABLET ORAL at 20:46

## 2019-03-29 RX ADMIN — DOCUSATE SODIUM SCH MG: 100 CAPSULE, LIQUID FILLED ORAL at 08:01

## 2019-03-29 RX ADMIN — INSULIN GLARGINE SCH UNIT: 100 INJECTION, SOLUTION SUBCUTANEOUS at 08:02

## 2019-03-29 RX ADMIN — MAGNESIUM HYDROXIDE PRN ML: 400 SUSPENSION ORAL at 19:43

## 2019-03-29 RX ADMIN — DOCUSATE SODIUM SCH MG: 100 CAPSULE, LIQUID FILLED ORAL at 20:44

## 2019-03-29 RX ADMIN — CALCIUM SCH MG: 500 TABLET ORAL at 08:01

## 2019-03-29 NOTE — HOSPITALIST PROGRESS NOTE
Subjective


Progress Notes


Subjective


She reports some improvement in symptoms. She does have some complaints of 


nausea.





Patient Complains of:


Cardiovascular:  No: Chest Pain


Respiratory:  No: Shortness of Breath





Physical Exam





Vital Signs








  Date Time  Temp Pulse Resp B/P (MAP) Pulse Ox O2 Delivery O2 Flow Rate FiO2


 


3/29/19 08:02     94 Nasal Cannula 3.0 


 


3/29/19 06:47 98.2 100 18 91/54 (66)    














Intake and Output 


 


 3/29/19





 07:00


 


Intake Total 420 ml


 


Balance 420 ml


 


 


 


Intake Oral 420 ml


 


# Voids 3








General Appearance:  Alert, Awake, No Acute Distress, Afebrile


Neuro:  No Gross deficits


Cardiovascular:  Regular Rate and Rhythm


Respiratory:  No Respiratory Distress, Clear to Auscultation


GI:  Soft and Non-Tender


Psych:  Alert & Oriented X3, Appropriate Mood & Affect


Result Diagram:  


3/29/19 0746                                                                    


           3/29/19 0746








Assessment and Plan


Problems:  


(1) Spinal stenosis


Assessment & Plan:  Post operative, management per primary.





(2) HTN (hypertension)


Assessment & Plan:  Hold home lisinopril, spironolactone. Will continue to hold 


BP medications. She received IV fluids 3/28 to help with hypotension and 


lightheadedness. Symptoms have improved but still has lower blood pressures, 


continue to monitor.  





(3) DM (diabetes mellitus)


Assessment & Plan:  Decrease insulin to 10U Lantus BID, accuchecks achs, SSI 


level 2. Hold metformin and Januvia while inpatient.





(4) RLS (restless legs syndrome)


Status:  Chronic


Assessment & Plan:  Continue chronic Mirapex.








Exam


Sepsis Risk:  No Definite Risk











JONG PANDYA FN            Mar 29, 2019 10:28

## 2019-03-29 NOTE — NUR
Physical Therapy Impression



Pt requires Mod A for supine>sit transfer via log roll.  A larger brace

was donned and Pt able to stand and ambulate to the bathroom with CGA

using RW.  Pt requested to attempt a BM.  Pt left with call light.

Nursing aware.  Recommend acute or short-term subacute rehab.





Physical Therapy Goals



1: Pt to complete bed mobility wtih SBA

2: Pt to complete transfers with SBA and appropriate AD

3: Pt to ambulate 150' with SBA and appropriate AD

4: Pt to asc/desc 6 stairs with railing and SBA

5: Pt to I)ly derrek/doff brace.



Patient's Goals

## 2019-03-29 NOTE — NUR
Physical Therapy Impression



Pt demonstrated appropriate log roll technique for sit>supine with Mod A

for B LEs. Pt transferred from chair>commode>bed via stand pivot transfers

using RW and CGA. Attempted to derrek Mili brace per MD order, however,

unable due to size being too small.  Spoke with April in Dr. Miguel's office

and larger brace will be provided.  [facility]  sent to  brace.

Will mobilize further when brace arrives.





Physical Therapy Goals



1: Pt to complete bed mobility wtih SBA

2: Pt to complete transfers with SBA and appropriate AD

3: Pt to ambulate 150' with SBA and appropriate AD

4: Pt to asc/desc 6 stairs with railing and SBA

5: Pt to I)ly derrek/goran brace.



Patient's Goals

## 2019-03-30 VITALS — SYSTOLIC BLOOD PRESSURE: 110 MMHG | DIASTOLIC BLOOD PRESSURE: 68 MMHG

## 2019-03-30 VITALS — DIASTOLIC BLOOD PRESSURE: 53 MMHG | SYSTOLIC BLOOD PRESSURE: 98 MMHG

## 2019-03-30 VITALS — DIASTOLIC BLOOD PRESSURE: 53 MMHG | SYSTOLIC BLOOD PRESSURE: 100 MMHG

## 2019-03-30 VITALS — DIASTOLIC BLOOD PRESSURE: 65 MMHG | SYSTOLIC BLOOD PRESSURE: 106 MMHG

## 2019-03-30 VITALS — DIASTOLIC BLOOD PRESSURE: 52 MMHG | SYSTOLIC BLOOD PRESSURE: 110 MMHG

## 2019-03-30 VITALS — SYSTOLIC BLOOD PRESSURE: 101 MMHG | DIASTOLIC BLOOD PRESSURE: 61 MMHG

## 2019-03-30 RX ADMIN — POLYETHYLENE GLYCOL 3350 SCH GM: 17 POWDER, FOR SOLUTION ORAL at 09:27

## 2019-03-30 RX ADMIN — OXYCODONE HYDROCHLORIDE PRN MG: 5 TABLET ORAL at 00:38

## 2019-03-30 RX ADMIN — GABAPENTIN SCH MG: 300 CAPSULE ORAL at 21:00

## 2019-03-30 RX ADMIN — ATORVASTATIN CALCIUM SCH MG: 40 TABLET, FILM COATED ORAL at 21:00

## 2019-03-30 RX ADMIN — HYDROCODONE BITARTRATE AND ACETAMINOPHEN PRN EACH: 5; 325 TABLET ORAL at 09:27

## 2019-03-30 RX ADMIN — INSULIN LISPRO PRN UNIT: 100 INJECTION, SOLUTION INTRAVENOUS; SUBCUTANEOUS at 21:17

## 2019-03-30 RX ADMIN — GABAPENTIN SCH MG: 300 CAPSULE ORAL at 13:23

## 2019-03-30 RX ADMIN — POLYETHYLENE GLYCOL 3350 SCH GM: 17 POWDER, FOR SOLUTION ORAL at 21:00

## 2019-03-30 RX ADMIN — HYDROCODONE BITARTRATE AND ACETAMINOPHEN PRN EACH: 5; 325 TABLET ORAL at 17:40

## 2019-03-30 RX ADMIN — AMITRIPTYLINE HYDROCHLORIDE SCH MG: 25 TABLET, FILM COATED ORAL at 09:28

## 2019-03-30 RX ADMIN — HYDROCODONE BITARTRATE AND ACETAMINOPHEN PRN EACH: 5; 325 TABLET ORAL at 23:58

## 2019-03-30 RX ADMIN — PROPRANOLOL HYDROCHLORIDE SCH MG: 20 TABLET ORAL at 21:00

## 2019-03-30 RX ADMIN — DIAZEPAM PRN MG: 5 TABLET ORAL at 02:54

## 2019-03-30 RX ADMIN — PROPRANOLOL HYDROCHLORIDE SCH MG: 20 TABLET ORAL at 09:28

## 2019-03-30 RX ADMIN — INSULIN GLARGINE SCH UNIT: 100 INJECTION, SOLUTION SUBCUTANEOUS at 09:27

## 2019-03-30 RX ADMIN — CALCIUM SCH MG: 500 TABLET ORAL at 09:27

## 2019-03-30 RX ADMIN — PRAMIPEXOLE DIHYDROCHLORIDE SCH MG: 0.25 TABLET ORAL at 21:00

## 2019-03-30 RX ADMIN — OXYCODONE HYDROCHLORIDE PRN MG: 5 TABLET ORAL at 05:29

## 2019-03-30 RX ADMIN — AMITRIPTYLINE HYDROCHLORIDE SCH MG: 25 TABLET, FILM COATED ORAL at 21:00

## 2019-03-30 RX ADMIN — HYDROCODONE BITARTRATE AND ACETAMINOPHEN PRN EACH: 5; 325 TABLET ORAL at 02:54

## 2019-03-30 RX ADMIN — CETIRIZINE HYDROCHLORIDE SCH MG: 10 TABLET, FILM COATED ORAL at 21:00

## 2019-03-30 RX ADMIN — DOCUSATE SODIUM SCH MG: 100 CAPSULE, LIQUID FILLED ORAL at 09:27

## 2019-03-30 RX ADMIN — INSULIN GLARGINE SCH UNIT: 100 INJECTION, SOLUTION SUBCUTANEOUS at 21:17

## 2019-03-30 RX ADMIN — OXYCODONE HYDROCHLORIDE PRN MG: 5 TABLET ORAL at 13:22

## 2019-03-30 RX ADMIN — DOCUSATE SODIUM SCH MG: 100 CAPSULE, LIQUID FILLED ORAL at 21:00

## 2019-03-30 RX ADMIN — MAGNESIUM HYDROXIDE PRN ML: 400 SUSPENSION ORAL at 11:48

## 2019-03-30 NOTE — NUR
Physical Therapy Impression



Pt. donned [place] brace with Asa, requiring verbal cuing to tighten straps

and assist to adjust once standing. Pt. ambulated 20' with RW, CGA. Pt.

benefitted from verbal cues for transfer ability. She demonstrates L sided

weight shift with transfers, stating she has pain, tingling and itching

all down her RLE.





Physical Therapy Goals



1: Pt to complete bed mobility wtih SBA

2: Pt to complete transfers with SBA and appropriate AD

3: Pt to ambulate 150' with SBA and appropriate AD

4: Pt to asc/desc 6 stairs with railing and SBA

5: Pt to I)ly derrek/doff brace.



Patient's Goals

## 2019-03-30 NOTE — HOSPITALIST PROGRESS NOTE
Subjective


Progress Notes


Subjective


53F admitted after laminectomy and lumbar fusion. [place] overnight, slow recovery 


and difficult getting around.





Patient Complains of:


Cardiovascular:  No: Chest Pain


Gastrointestinal:  No Nausea, No Vomiting





Physical Exam





Vital Signs








  Date Time  Temp Pulse Resp B/P (MAP) Pulse Ox O2 Delivery O2 Flow Rate FiO2


 


3/30/19 11:52     77   


 


3/30/19 11:48 98.6 94 12 98/53 (68)  Nasal Cannula 2.0 














Intake and Output 


 


 3/30/19





 06:59


 


Intake Total 300 ml


 


Balance 300 ml


 


 


 


Intake Oral 300 ml


 


# Voids 6








General Appearance:  Alert, Awake, No Acute Distress


Neuro:  No Gross deficits


ENT:  Normal


Cardiovascular:  Normal Rhythm & Peripheral Pulses


Respiratory:  No Respiratory Distress


Extremities:  Soft and Non Tender


Integumentary:  Skin Intact without Lesion / Mass


Result Diagram:  


3/29/19 0746                                                                    


           3/29/19 0746








Assessment and Plan


Problems:  


(1) Spinal stenosis


Assessment & Plan:  Post operative, management per primary.





(2) HTN (hypertension)


Assessment & Plan:  Hold home lisinopril, spironolactone. Will continue to hold 


BP medications. She received IV fluids 3/28 to help with hypotension and 


lightheadedness. Symptoms have improved but still has lower blood pressures, 


continue to monitor.  





(3) DM (diabetes mellitus)


Assessment & Plan:  Decrease insulin to 20U Lantus BID, accuchecks achs, SSI 


level 2. Hold metformin and Januvia while inpatient.





(4) RLS (restless legs syndrome)


Status:  Chronic


Assessment & Plan:  Continue chronic Mirapex.








Exam


Sepsis Risk:  No Definite Risk











MANGO HIGGINS DO       Mar 30, 2019 13:14

## 2019-03-30 NOTE — NUR
patient is very lethargic after Lortab and Benadryl recd at 1740, awakens only to painful 
stimuli and then falls right back to sleep, Dr Sofia-Still aware, night time medication 
held ,

## 2019-03-31 VITALS — SYSTOLIC BLOOD PRESSURE: 102 MMHG | DIASTOLIC BLOOD PRESSURE: 55 MMHG

## 2019-03-31 VITALS — SYSTOLIC BLOOD PRESSURE: 98 MMHG | DIASTOLIC BLOOD PRESSURE: 57 MMHG

## 2019-03-31 VITALS — SYSTOLIC BLOOD PRESSURE: 113 MMHG | DIASTOLIC BLOOD PRESSURE: 64 MMHG

## 2019-03-31 VITALS — SYSTOLIC BLOOD PRESSURE: 135 MMHG | DIASTOLIC BLOOD PRESSURE: 67 MMHG

## 2019-03-31 VITALS — DIASTOLIC BLOOD PRESSURE: 71 MMHG | SYSTOLIC BLOOD PRESSURE: 116 MMHG

## 2019-03-31 VITALS — DIASTOLIC BLOOD PRESSURE: 64 MMHG | SYSTOLIC BLOOD PRESSURE: 108 MMHG

## 2019-03-31 RX ADMIN — INSULIN GLARGINE SCH UNIT: 100 INJECTION, SOLUTION SUBCUTANEOUS at 08:37

## 2019-03-31 RX ADMIN — INSULIN GLARGINE SCH UNIT: 100 INJECTION, SOLUTION SUBCUTANEOUS at 21:52

## 2019-03-31 RX ADMIN — POLYETHYLENE GLYCOL 3350 SCH GM: 17 POWDER, FOR SOLUTION ORAL at 21:00

## 2019-03-31 RX ADMIN — CETIRIZINE HYDROCHLORIDE SCH MG: 10 TABLET, FILM COATED ORAL at 21:38

## 2019-03-31 RX ADMIN — HYDROCODONE BITARTRATE AND ACETAMINOPHEN PRN EACH: 5; 325 TABLET ORAL at 08:37

## 2019-03-31 RX ADMIN — INSULIN LISPRO PRN UNIT: 100 INJECTION, SOLUTION INTRAVENOUS; SUBCUTANEOUS at 11:27

## 2019-03-31 RX ADMIN — INSULIN LISPRO PRN UNIT: 100 INJECTION, SOLUTION INTRAVENOUS; SUBCUTANEOUS at 07:22

## 2019-03-31 RX ADMIN — Medication PRN MG: at 14:55

## 2019-03-31 RX ADMIN — INSULIN LISPRO PRN UNIT: 100 INJECTION, SOLUTION INTRAVENOUS; SUBCUTANEOUS at 16:20

## 2019-03-31 RX ADMIN — CALCIUM SCH MG: 500 TABLET ORAL at 08:37

## 2019-03-31 RX ADMIN — ATORVASTATIN CALCIUM SCH MG: 40 TABLET, FILM COATED ORAL at 21:39

## 2019-03-31 RX ADMIN — AMITRIPTYLINE HYDROCHLORIDE SCH MG: 25 TABLET, FILM COATED ORAL at 08:37

## 2019-03-31 RX ADMIN — PROPRANOLOL HYDROCHLORIDE SCH MG: 20 TABLET ORAL at 21:39

## 2019-03-31 RX ADMIN — DOCUSATE SODIUM SCH MG: 100 CAPSULE, LIQUID FILLED ORAL at 21:38

## 2019-03-31 RX ADMIN — GABAPENTIN SCH MG: 300 CAPSULE ORAL at 13:28

## 2019-03-31 RX ADMIN — HYDROCODONE BITARTRATE AND ACETAMINOPHEN PRN EACH: 5; 325 TABLET ORAL at 04:34

## 2019-03-31 RX ADMIN — HYDROCODONE BITARTRATE AND ACETAMINOPHEN PRN EACH: 5; 325 TABLET ORAL at 16:28

## 2019-03-31 RX ADMIN — CYCLOBENZAPRINE HYDROCHLORIDE PRN MG: 10 TABLET, FILM COATED ORAL at 05:47

## 2019-03-31 RX ADMIN — POLYETHYLENE GLYCOL 3350 SCH GM: 17 POWDER, FOR SOLUTION ORAL at 04:34

## 2019-03-31 RX ADMIN — DOCUSATE SODIUM SCH MG: 100 CAPSULE, LIQUID FILLED ORAL at 08:36

## 2019-03-31 RX ADMIN — MAGNESIUM HYDROXIDE PRN ML: 400 SUSPENSION ORAL at 04:34

## 2019-03-31 RX ADMIN — PROPRANOLOL HYDROCHLORIDE SCH MG: 20 TABLET ORAL at 08:36

## 2019-03-31 RX ADMIN — GABAPENTIN SCH MG: 300 CAPSULE ORAL at 08:37

## 2019-03-31 RX ADMIN — CYCLOBENZAPRINE HYDROCHLORIDE PRN MG: 10 TABLET, FILM COATED ORAL at 21:45

## 2019-03-31 RX ADMIN — AMITRIPTYLINE HYDROCHLORIDE SCH MG: 25 TABLET, FILM COATED ORAL at 21:39

## 2019-03-31 RX ADMIN — MAGNESIUM HYDROXIDE PRN ML: 400 SUSPENSION ORAL at 08:36

## 2019-03-31 RX ADMIN — PRAMIPEXOLE DIHYDROCHLORIDE SCH MG: 0.25 TABLET ORAL at 21:39

## 2019-03-31 RX ADMIN — Medication PRN MG: at 21:40

## 2019-03-31 RX ADMIN — GABAPENTIN SCH MG: 300 CAPSULE ORAL at 21:39

## 2019-04-01 VITALS — DIASTOLIC BLOOD PRESSURE: 68 MMHG | SYSTOLIC BLOOD PRESSURE: 105 MMHG

## 2019-04-01 VITALS — SYSTOLIC BLOOD PRESSURE: 108 MMHG | DIASTOLIC BLOOD PRESSURE: 61 MMHG

## 2019-04-01 VITALS — SYSTOLIC BLOOD PRESSURE: 116 MMHG | DIASTOLIC BLOOD PRESSURE: 81 MMHG

## 2019-04-01 VITALS — SYSTOLIC BLOOD PRESSURE: 128 MMHG | DIASTOLIC BLOOD PRESSURE: 84 MMHG

## 2019-04-01 VITALS — SYSTOLIC BLOOD PRESSURE: 111 MMHG | DIASTOLIC BLOOD PRESSURE: 62 MMHG

## 2019-04-01 RX ADMIN — POLYETHYLENE GLYCOL 3350 SCH GM: 17 POWDER, FOR SOLUTION ORAL at 09:00

## 2019-04-01 RX ADMIN — PROPRANOLOL HYDROCHLORIDE SCH MG: 20 TABLET ORAL at 09:23

## 2019-04-01 RX ADMIN — GABAPENTIN SCH MG: 300 CAPSULE ORAL at 14:12

## 2019-04-01 RX ADMIN — GABAPENTIN SCH MG: 300 CAPSULE ORAL at 09:22

## 2019-04-01 RX ADMIN — INSULIN LISPRO PRN UNIT: 100 INJECTION, SOLUTION INTRAVENOUS; SUBCUTANEOUS at 08:08

## 2019-04-01 RX ADMIN — INSULIN LISPRO PRN UNIT: 100 INJECTION, SOLUTION INTRAVENOUS; SUBCUTANEOUS at 17:35

## 2019-04-01 RX ADMIN — HYDROCODONE BITARTRATE AND ACETAMINOPHEN PRN EACH: 5; 325 TABLET ORAL at 02:31

## 2019-04-01 RX ADMIN — CALCIUM SCH MG: 500 TABLET ORAL at 09:22

## 2019-04-01 RX ADMIN — INSULIN GLARGINE SCH UNIT: 100 INJECTION, SOLUTION SUBCUTANEOUS at 20:40

## 2019-04-01 RX ADMIN — INSULIN GLARGINE SCH UNIT: 100 INJECTION, SOLUTION SUBCUTANEOUS at 09:23

## 2019-04-01 RX ADMIN — PROPRANOLOL HYDROCHLORIDE SCH MG: 20 TABLET ORAL at 20:37

## 2019-04-01 RX ADMIN — INSULIN LISPRO PRN UNIT: 100 INJECTION, SOLUTION INTRAVENOUS; SUBCUTANEOUS at 20:42

## 2019-04-01 RX ADMIN — HYDROCODONE BITARTRATE AND ACETAMINOPHEN PRN EACH: 5; 325 TABLET ORAL at 12:53

## 2019-04-01 RX ADMIN — CETIRIZINE HYDROCHLORIDE SCH MG: 10 TABLET, FILM COATED ORAL at 20:36

## 2019-04-01 RX ADMIN — DOCUSATE SODIUM SCH MG: 100 CAPSULE, LIQUID FILLED ORAL at 09:00

## 2019-04-01 RX ADMIN — POLYETHYLENE GLYCOL 3350 SCH GM: 17 POWDER, FOR SOLUTION ORAL at 20:37

## 2019-04-01 RX ADMIN — CYCLOBENZAPRINE HYDROCHLORIDE PRN MG: 10 TABLET, FILM COATED ORAL at 09:30

## 2019-04-01 RX ADMIN — PRAMIPEXOLE DIHYDROCHLORIDE SCH MG: 0.25 TABLET ORAL at 20:36

## 2019-04-01 RX ADMIN — INSULIN LISPRO PRN UNIT: 100 INJECTION, SOLUTION INTRAVENOUS; SUBCUTANEOUS at 12:21

## 2019-04-01 RX ADMIN — ATORVASTATIN CALCIUM SCH MG: 40 TABLET, FILM COATED ORAL at 20:36

## 2019-04-01 RX ADMIN — DOCUSATE SODIUM SCH MG: 100 CAPSULE, LIQUID FILLED ORAL at 20:37

## 2019-04-01 RX ADMIN — Medication PRN MG: at 20:36

## 2019-04-01 RX ADMIN — AMITRIPTYLINE HYDROCHLORIDE SCH MG: 25 TABLET, FILM COATED ORAL at 20:36

## 2019-04-01 RX ADMIN — AMITRIPTYLINE HYDROCHLORIDE SCH MG: 25 TABLET, FILM COATED ORAL at 09:22

## 2019-04-01 RX ADMIN — GABAPENTIN SCH MG: 300 CAPSULE ORAL at 20:36

## 2019-04-01 RX ADMIN — Medication PRN MG: at 09:30

## 2019-04-01 NOTE — HOSPITALIST PROGRESS NOTE
Subjective


Progress Notes


Subjective


She was admitted s/p lumbar fusion. She has no complaints this morning. She had 


no acute events overnight.





Patient Complains of:


Cardiovascular:  No: Chest Pain


Respiratory:  No: Shortness of Breath





Physical Exam





Vital Signs








  Date Time  Temp Pulse Resp B/P (MAP) Pulse Ox O2 Delivery O2 Flow Rate FiO2


 


4/1/19 09:50     86   


 


4/1/19 07:21      Nasal Cannula 2.0 


 


4/1/19 06:34 98.5 74 18 111/62 (78)    














Intake and Output 


 


 4/1/19





 07:00


 


Intake Total 1160 ml


 


Balance 1160 ml


 


 


 


Intake Oral 1160 ml


 


# Voids 6


 


# Bowel Movements 2








General Appearance:  Alert, Awake, No Acute Distress, Afebrile


Neuro:  No Gross deficits, Other (has neuropathy symptoms to right leg)


Cardiovascular:  Regular Rate and Rhythm


Respiratory:  No Respiratory Distress, Clear to Auscultation


Extremities:  Warm, Perfused; No Edema


Psych:  Alert & Oriented X3, Appropriate Mood & Affect


Result Diagram:  


3/29/19 0746                                                                    


           3/29/19 0746








Assessment and Plan


Problems:  


(1) Spinal stenosis


Assessment & Plan:  Post operative, management per primary.





(2) HTN (hypertension)


Assessment & Plan:  Hold home lisinopril, spironolactone. Will continue to hold 


BP medications. She received IV fluids 3/28 to help with hypotension and 


lightheadedness. BP now wnl.





(3) DM (diabetes mellitus)


Assessment & Plan:  Decrease insulin to 20U Lantus BID, accuchecks achs, SSI 


level 2. Hold metformin. Restart Januvia. 





(4) RLS (restless legs syndrome)


Status:  Chronic


Assessment & Plan:  Continue chronic Mirapex.








Exam


Sepsis Risk:  No Definite Risk











JONG PANDYA FNP             Apr 1, 2019 10:30

## 2019-04-01 NOTE — NUR
Physical Therapy Impression



Pt demos difficulty with recall of log roll technique with flat bed and

Mod assist provided by PT to complete technique appropriately to avoid

side flexion.  Once seated at EOB, pt requires Mod assist to don LSO and

requires cues not to tangle lacing velcro for ease of application.  Pt

agreeable to attempt longer distance ambulation today with FWW in hallway.

 Pt's gait is limited by strength in LE's causing B) knee buckling as pt

becomes more fatigued.  This is then compounded by the fact that pt seems

unaware of her declining ability while pushing the walker too far away and

not supporting herself adequately through UE's, and requires cues from PT

to take a standing rest break before becoming too fatigued.  After 60'

ambulation, pt required a sitting rest break to be able to safely tolerate

return ambulation to room. Pt is progressing gradually, but would

certainly benefit from further aggressive neuro rehab and strengthening to

allow for return to prior level of function and safe return home when

appropriate.  Recommend ARU for three hour daily treatment to optimize

return of function in a more efficient manner.





Physical Therapy Goals



1: Pt to complete bed mobility wtih SBA

2: Pt to complete transfers with SBA and appropriate AD

3: Pt to ambulate 150' with SBA and appropriate AD

4: Pt to asc/desc 6 stairs with railing and SBA

5: Pt to I)ly derrek/doff brace.



Patient's Goals

## 2019-04-02 VITALS — SYSTOLIC BLOOD PRESSURE: 116 MMHG | DIASTOLIC BLOOD PRESSURE: 71 MMHG

## 2019-04-02 VITALS — DIASTOLIC BLOOD PRESSURE: 70 MMHG | SYSTOLIC BLOOD PRESSURE: 111 MMHG

## 2019-04-02 VITALS — DIASTOLIC BLOOD PRESSURE: 75 MMHG | SYSTOLIC BLOOD PRESSURE: 101 MMHG

## 2019-04-02 VITALS — SYSTOLIC BLOOD PRESSURE: 121 MMHG | DIASTOLIC BLOOD PRESSURE: 78 MMHG

## 2019-04-02 VITALS — SYSTOLIC BLOOD PRESSURE: 114 MMHG | DIASTOLIC BLOOD PRESSURE: 70 MMHG

## 2019-04-02 RX ADMIN — INSULIN LISPRO PRN UNIT: 100 INJECTION, SOLUTION INTRAVENOUS; SUBCUTANEOUS at 11:51

## 2019-04-02 RX ADMIN — ATORVASTATIN CALCIUM SCH MG: 40 TABLET, FILM COATED ORAL at 20:22

## 2019-04-02 RX ADMIN — CETIRIZINE HYDROCHLORIDE SCH MG: 10 TABLET, FILM COATED ORAL at 20:21

## 2019-04-02 RX ADMIN — INSULIN LISPRO PRN UNIT: 100 INJECTION, SOLUTION INTRAVENOUS; SUBCUTANEOUS at 20:26

## 2019-04-02 RX ADMIN — POLYETHYLENE GLYCOL 3350 SCH GM: 17 POWDER, FOR SOLUTION ORAL at 09:00

## 2019-04-02 RX ADMIN — HYDROCODONE BITARTRATE AND ACETAMINOPHEN PRN EACH: 5; 325 TABLET ORAL at 07:34

## 2019-04-02 RX ADMIN — PROPRANOLOL HYDROCHLORIDE SCH MG: 20 TABLET ORAL at 09:28

## 2019-04-02 RX ADMIN — GABAPENTIN SCH MG: 300 CAPSULE ORAL at 20:21

## 2019-04-02 RX ADMIN — INSULIN GLARGINE SCH UNIT: 100 INJECTION, SOLUTION SUBCUTANEOUS at 20:22

## 2019-04-02 RX ADMIN — INSULIN LISPRO PRN UNIT: 100 INJECTION, SOLUTION INTRAVENOUS; SUBCUTANEOUS at 08:13

## 2019-04-02 RX ADMIN — Medication PRN MG: at 20:22

## 2019-04-02 RX ADMIN — CYCLOBENZAPRINE HYDROCHLORIDE PRN MG: 10 TABLET, FILM COATED ORAL at 21:59

## 2019-04-02 RX ADMIN — AMITRIPTYLINE HYDROCHLORIDE SCH MG: 25 TABLET, FILM COATED ORAL at 20:21

## 2019-04-02 RX ADMIN — GABAPENTIN SCH MG: 300 CAPSULE ORAL at 14:32

## 2019-04-02 RX ADMIN — INSULIN LISPRO PRN UNIT: 100 INJECTION, SOLUTION INTRAVENOUS; SUBCUTANEOUS at 16:48

## 2019-04-02 RX ADMIN — PROPRANOLOL HYDROCHLORIDE SCH MG: 20 TABLET ORAL at 20:34

## 2019-04-02 RX ADMIN — GABAPENTIN SCH MG: 300 CAPSULE ORAL at 09:27

## 2019-04-02 RX ADMIN — POLYETHYLENE GLYCOL 3350 SCH GM: 17 POWDER, FOR SOLUTION ORAL at 20:22

## 2019-04-02 RX ADMIN — INSULIN GLARGINE SCH UNIT: 100 INJECTION, SOLUTION SUBCUTANEOUS at 09:31

## 2019-04-02 RX ADMIN — CALCIUM SCH MG: 500 TABLET ORAL at 09:28

## 2019-04-02 RX ADMIN — PRAMIPEXOLE DIHYDROCHLORIDE SCH MG: 0.25 TABLET ORAL at 20:21

## 2019-04-02 RX ADMIN — DOCUSATE SODIUM SCH MG: 100 CAPSULE, LIQUID FILLED ORAL at 09:00

## 2019-04-02 RX ADMIN — DOCUSATE SODIUM SCH MG: 100 CAPSULE, LIQUID FILLED ORAL at 20:20

## 2019-04-02 RX ADMIN — HYDROCODONE BITARTRATE AND ACETAMINOPHEN PRN EACH: 5; 325 TABLET ORAL at 21:59

## 2019-04-02 RX ADMIN — AMITRIPTYLINE HYDROCHLORIDE SCH MG: 25 TABLET, FILM COATED ORAL at 09:28

## 2019-04-02 NOTE — HOSPITALIST PROGRESS NOTE
Subjective


Progress Notes


Subjective


She was admitted s/p lumbar fusion. She has complaints of neuropathic pain to 


right lower extremity post-operatively.





Patient Complains of:


Cardiovascular:  No: Chest Pain


Respiratory:  No: Shortness of Breath





Physical Exam





Vital Signs








  Date Time  Temp Pulse Resp B/P (MAP) Pulse Ox O2 Delivery O2 Flow Rate FiO2


 


4/2/19 07:35     93 Nasal Cannula 2.0 


 


4/2/19 07:10 97.8 81 16 121/78 (92)    














Intake and Output 


 


 4/2/19





 07:00


 


Intake Total 570 ml


 


Balance 570 ml


 


 


 


Intake Oral 570 ml


 


# Voids 10


 


# Bowel Movements 7








General Appearance:  Alert, Awake, No Acute Distress, Afebrile


Neuro:  No Gross deficits


Cardiovascular:  Regular Rate and Rhythm


Respiratory:  No Respiratory Distress, Clear to Auscultation


GI:  Soft and Non-Tender


Extremities:  Warm, Perfused; No Edema


Psych:  Alert & Oriented X3, Appropriate Mood & Affect


Result Diagram:  


3/29/19 0746                                                                    


           3/29/19 0746








Assessment and Plan


Problems:  


(1) Spinal stenosis


Assessment & Plan:  Post operative, management per primary. Will increase her 


Gabapentin dose to 900mg TID to help with neuropathic pain post-operatively. 





(2) HTN (hypertension)


Assessment & Plan:  Hold home lisinopril, spironolactone. Will continue to hold 


BP medications. She received IV fluids 3/28 to help with hypotension and 


lightheadedness. BP now wnl.





(3) DM (diabetes mellitus)


Assessment & Plan:  Decrease insulin to 20U Lantus BID, accuchecks achs, SSI 


level 2. Restart Januvia and Metformin. 





(4) RLS (restless legs syndrome)


Status:  Chronic


Assessment & Plan:  Continue chronic Mirapex.








Exam


Sepsis Risk:  No Definite Risk





Problem Qualifiers





(1) HTN (hypertension):  


Hypertension type:  essential hypertension  Qualified Codes:  I10 - Essential 


(primary) hypertension


(2) DM (diabetes mellitus):  


Diabetes mellitus type:  type 2  Diabetes mellitus long term insulin use:  with 


long term use  Diabetes mellitus complication status:  without complication  


Qualified Codes:  E11.9 - Type 2 diabetes mellitus without complications; Z79.4 


- Long term (current) use of insulin








JONG PANDYA [facility]             Apr 2, 2019 10:55

## 2019-04-02 NOTE — NUR
Physical Therapy Impression



Pt notes that she is experiencing a change in symptoms in LE's with

increased sharp shooting pains, rather than her previous "pins and

needles" sensation.  Pt indicates that even the bedding or other medical

lines touching her feet can increase these hypersensitive symptoms. During

bed mobility, pt continues to require assist to lift LE's into bed with

sit to supine transfers and attempts to use trapeze bar, which contributes

to side flexion position.  Pt again reminded to utilize log roll technique

to avoid twisting or excessive flexion. Pt tolerated greater distance of

100' ambulation with FWW and O2, prior to accepting a seated rest break.

Pt did require several standing rest breaks before this, to gain center of

gravity over base of support and ensure that B) LE's were strong enough to

continue with further gait trng.  Pt's ability for safe ambulation

fluctuates greatly throughout the day and decreases as she becomes more

fatigued.  Pt would continue to benefit from further ARU transfer prior to

d/c home, in order to gain consistency with safe mobility and ensure

return to prior level of function, as pt was indep with all ADL's and

IADL's without an assistive device and was working as well, leading up to

her surgery.





Physical Therapy Goals



1: Pt to complete bed mobility wtih SBA

2: Pt to complete transfers with SBA and appropriate AD

3: Pt to ambulate 150' with SBA and appropriate AD

4: Pt to asc/desc 6 stairs with railing and SBA

5: Pt to I)ly derrek/ff brace.



Patient's Goals

## 2019-04-03 VITALS — SYSTOLIC BLOOD PRESSURE: 126 MMHG | DIASTOLIC BLOOD PRESSURE: 80 MMHG

## 2019-04-03 VITALS — DIASTOLIC BLOOD PRESSURE: 87 MMHG | SYSTOLIC BLOOD PRESSURE: 130 MMHG

## 2019-04-03 VITALS — DIASTOLIC BLOOD PRESSURE: 59 MMHG | SYSTOLIC BLOOD PRESSURE: 110 MMHG

## 2019-04-03 VITALS — SYSTOLIC BLOOD PRESSURE: 112 MMHG | DIASTOLIC BLOOD PRESSURE: 57 MMHG

## 2019-04-03 VITALS — SYSTOLIC BLOOD PRESSURE: 109 MMHG | DIASTOLIC BLOOD PRESSURE: 91 MMHG

## 2019-04-03 VITALS — DIASTOLIC BLOOD PRESSURE: 89 MMHG | SYSTOLIC BLOOD PRESSURE: 109 MMHG

## 2019-04-03 RX ADMIN — GABAPENTIN SCH MG: 300 CAPSULE ORAL at 21:28

## 2019-04-03 RX ADMIN — GABAPENTIN SCH MG: 300 CAPSULE ORAL at 08:19

## 2019-04-03 RX ADMIN — INSULIN GLARGINE SCH UNIT: 100 INJECTION, SOLUTION SUBCUTANEOUS at 08:20

## 2019-04-03 RX ADMIN — AMITRIPTYLINE HYDROCHLORIDE SCH MG: 25 TABLET, FILM COATED ORAL at 08:19

## 2019-04-03 RX ADMIN — CYCLOBENZAPRINE HYDROCHLORIDE PRN MG: 10 TABLET, FILM COATED ORAL at 21:29

## 2019-04-03 RX ADMIN — POLYETHYLENE GLYCOL 3350 SCH GM: 17 POWDER, FOR SOLUTION ORAL at 21:00

## 2019-04-03 RX ADMIN — HYDROCODONE BITARTRATE AND ACETAMINOPHEN PRN EACH: 5; 325 TABLET ORAL at 11:15

## 2019-04-03 RX ADMIN — POLYETHYLENE GLYCOL 3350 SCH GM: 17 POWDER, FOR SOLUTION ORAL at 08:26

## 2019-04-03 RX ADMIN — ATORVASTATIN CALCIUM SCH MG: 40 TABLET, FILM COATED ORAL at 21:29

## 2019-04-03 RX ADMIN — DOCUSATE SODIUM SCH MG: 100 CAPSULE, LIQUID FILLED ORAL at 21:30

## 2019-04-03 RX ADMIN — INSULIN LISPRO PRN UNIT: 100 INJECTION, SOLUTION INTRAVENOUS; SUBCUTANEOUS at 17:19

## 2019-04-03 RX ADMIN — INSULIN LISPRO PRN UNIT: 100 INJECTION, SOLUTION INTRAVENOUS; SUBCUTANEOUS at 12:10

## 2019-04-03 RX ADMIN — INSULIN LISPRO PRN UNIT: 100 INJECTION, SOLUTION INTRAVENOUS; SUBCUTANEOUS at 21:32

## 2019-04-03 RX ADMIN — INSULIN GLARGINE SCH UNIT: 100 INJECTION, SOLUTION SUBCUTANEOUS at 21:32

## 2019-04-03 RX ADMIN — CETIRIZINE HYDROCHLORIDE SCH MG: 10 TABLET, FILM COATED ORAL at 21:27

## 2019-04-03 RX ADMIN — AMITRIPTYLINE HYDROCHLORIDE SCH MG: 25 TABLET, FILM COATED ORAL at 21:29

## 2019-04-03 RX ADMIN — Medication PRN MG: at 20:31

## 2019-04-03 RX ADMIN — INSULIN LISPRO PRN UNIT: 100 INJECTION, SOLUTION INTRAVENOUS; SUBCUTANEOUS at 08:19

## 2019-04-03 RX ADMIN — GABAPENTIN SCH MG: 300 CAPSULE ORAL at 14:25

## 2019-04-03 RX ADMIN — PROPRANOLOL HYDROCHLORIDE SCH MG: 20 TABLET ORAL at 21:31

## 2019-04-03 RX ADMIN — PROPRANOLOL HYDROCHLORIDE SCH MG: 20 TABLET ORAL at 08:19

## 2019-04-03 RX ADMIN — DOCUSATE SODIUM SCH MG: 100 CAPSULE, LIQUID FILLED ORAL at 08:18

## 2019-04-03 RX ADMIN — CALCIUM SCH MG: 500 TABLET ORAL at 08:19

## 2019-04-03 RX ADMIN — Medication PRN MG: at 05:42

## 2019-04-03 RX ADMIN — PRAMIPEXOLE DIHYDROCHLORIDE SCH MG: 0.25 TABLET ORAL at 21:27

## 2019-04-03 RX ADMIN — HYDROCODONE BITARTRATE AND ACETAMINOPHEN PRN EACH: 5; 325 TABLET ORAL at 17:19

## 2019-04-03 NOTE — MEDICAL NUTRITION THERAPY
Nutrition Anthropometrics


Height (Inches):  60.00


Height (Calculated Centimeters:  152.403585


Weight (Pounds):  215


Weight (Calculated Kilograms):  97.522


Dereje Nutrition Score:         Adequate 


Dereje Nutrition Risk Score:  19


Dietary Referral


Nutrition Risk Factors:      


Nutrition Risk Comment:





Physical Findings


Physical Appearance:  Morbidly Obese 40+


Skin Appearance


Skin Appearance:


Edema


Edema Location Modifier: Both 


Edema Location:              Foot 


Type of Edema:                 


Degree of Edema:


Gastrointestinal Symptoms


GI Symtoms:                Constipation 


Tube Present:               


Bowel Sounds:              


Recent Bowel Pattern:    


Stool Characteristics:





Nutritional Diagnosis


Nutritional Risk Acuity 2:  Blood Glucose > 300mg/dl


Nutritional Risk Acuity 3:  Morbid Obesity


Nutritional Risk Acuity 4:  Good Appetite, Modified Diet


Past Medical History:  


Hx if DM, HTN.


Nutritional Acuity:  2-Moderate


Nutrition Diagnosis:  Inappropriate Carb Intake


Nutrition Etiology:  Physiological Causes


Nutrition Problem/Etiology/Sym:  


Inappropriate carb intake related to physiological causes as evidenced by


elevated WBG above 300.


Energy Requirement:  1979 (MSJ, 1.1 TEF, 1.2 AF)


Adjusted Energy Requirement Re:  1479 (-500kcal)


Protein Requirement:  78 (0.8g AA/kg of BW)


Fluid Requirement:  1479 (1ml/ckal)


Diet Type:  Diabetic


Nutrition Intervention:  Cont diet as ordered, Check glucose


Diet Comment To RSA:  


ALLERGIC TO WALNUTS.





Nutritional Education


Nutrition Education Topic:  Diabetic Nutrition


Learning Readiness:  Little Interest


Teaching Methods:  Discussion, Handout


Teaching Recipient:  Patient


Nutrition Counseling:  


Dietetic intern talked to pt about diabetes and managing blood glucose


levels through diet. Pt stated that diet does not help with her glucose


levels, however she would like to take the diabetes plate handout home.


Dietetic intern provided pt with handout and [facility] dietitian's card in case


she had questions later. -JJ





Nutrition Monitoring & Eval


Nutrition Goals:  Eat % Meal


RD Patient Assessment Time:  30 minutes


RD Assessment Type:  RD Assessment


Patient Nutrition Acuity:  2-Moderate


Follow Up Date:  Apr 7, 2019


Nutritional Comment:  


3/28: Pt admitted for spinal stenosis, HTN, DM. Pt has hx of DM, HTN. Pt 


has decreased sodium (133), calcium (7.9), albumin (3.0), total protein 


(5.4), elevated WBG (127-203) and RBG (132) and AST (40) levels. Pt on a 


ALAN diet consuming 100% of meals. ALLERGIC TO WALNUTS. Continue to





monitor. -AINSLEY











4/3: Pt had a lumbar fusion and is having burning with urination. Pt has  


had elevated WBG (115-330). Pt is consuming % of diabetic meals.


Dietetic intern talked to pt about diabetes and managing blood glucose


levels through diet. Pt stated that diet does not help with her glucose


levels, however she would like to take the diabetes plate handout home.


Dietetic intern provided pt with handout and [facility] dietitian's card in case


she had questions later.


Continue to monitor intake blood glucose. -KIM CAMARA                     Apr 3, 2019 09:52

## 2019-04-03 NOTE — HOSPITALIST PROGRESS NOTE
Subjective


Progress Notes


Subjective


She was admitted after lumbar fusion. She continues to await insurance approval 


for placement. She has complaints of burning with urination, which started 


yesterday. She reports prior to surgery she did require 2 antibiotics to treat 


UTI and feels it may have returned. Otherwise, she has no complaints.





Patient Complains of:


Cardiovascular:  No: Chest Pain


Respiratory:  No: Shortness of Breath





Physical Exam





Vital Signs








  Date Time  Temp Pulse Resp B/P (MAP) Pulse Ox O2 Delivery O2 Flow Rate FiO2


 


4/3/19 07:26 97.7 89 20 130/87 (101) 94 Nasal Cannula  


 


4/3/19 03:10       2.0 














Intake and Output 


 


 4/3/19





 07:00


 


Intake Total 600 ml


 


Balance 600 ml


 


 


 


Intake Oral 600 ml


 


# Voids 8


 


# Bowel Movements 2








General Appearance:  Alert, Awake, No Acute Distress, Afebrile


Neuro:  No Gross deficits


Cardiovascular:  Regular Rate and Rhythm


Respiratory:  No Respiratory Distress, Clear to Auscultation


GI:  Soft and Non-Tender


Extremities:  Warm, Perfused, Edema (non pitting edema)


Psych:  Alert & Oriented X3, Appropriate Mood & Affect





Assessment and Plan


Problems:  


(1) Spinal stenosis


Assessment & Plan:  Post operative, management per primary. Will increase her 


Gabapentin dose to 900mg TID to help with neuropathic pain post-operatively. 





(2) HTN (hypertension)


Assessment & Plan:  Hold home lisinopril, spironolactone. Will continue to hold 


BP medications. She received IV fluids 3/28 to help with hypotension and 


lightheadedness. BP now wnl.





(3) DM (diabetes mellitus)


Assessment & Plan:  Decrease insulin to 20U Lantus BID, accuchecks achs, SSI 


level 2. Restart Januvia and Metformin. 





(4) RLS (restless legs syndrome)


Status:  Chronic


Assessment & Plan:  Continue chronic Mirapex.








Exam


Sepsis Risk:  No Definite Risk





Problem Qualifiers





(1) HTN (hypertension):  


Hypertension type:  essential hypertension  Qualified Codes:  I10 - Essential 


(primary) hypertension


(2) DM (diabetes mellitus):  


Diabetes mellitus type:  type 2  Diabetes mellitus long term insulin use:  with 


long term use  Diabetes mellitus complication status:  without complication  


Qualified Codes:  E11.9 - Type 2 diabetes mellitus without complications; Z79.4 


- Long term (current) use of insulin








JONG PANDYA FN             Apr 3, 2019 08:47

## 2019-04-03 NOTE — NUR
Physical Therapy Impression



Pt completed ankle pumps in upright sitting position and it is noted that

L) dorsiflexion AROM is decreased compared to R).  Pt also demos decreased

AROM during long-arc quad exercise and can only lift approximately 30

degrees unassisted.  Hip flexion on L) also demos decreased AROM with only

10 degrees flexion from a slightly reclined posture in chair.  PT then

provided pt with AAROM for full range and no increased pain reported.  Pt

encouraged to control eccentric lowering of LE during hip flexion

(marching) and with long-arc quad exercise.  Following 10 reps with this

level of assistance, pt was then requested to attempt to complete the full

range indep.  Pt noted to have increased AROM by 5-10 degrees with each

movement.  Again, pt would greatly benefit from neuro rehab in an acute

rehab setting that would allow for more aggressive return to function.  We

are awaiting insurance authorization and acceptance to [facility] in [place] who

has not yet indicated her acceptance yet and we are currently on post-op

day 7.





Physical Therapy Goals



1: Pt to complete bed mobility wtih SBA

2: Pt to complete transfers with SBA and appropriate AD

3: Pt to ambulate 150' with SBA and appropriate AD

4: Pt to asc/desc 6 stairs with railing and SBA

5: Pt to I)ly derrek/ff brace.



Patient's Goals

## 2019-04-04 VITALS — SYSTOLIC BLOOD PRESSURE: 99 MMHG | DIASTOLIC BLOOD PRESSURE: 83 MMHG

## 2019-04-04 VITALS — SYSTOLIC BLOOD PRESSURE: 137 MMHG | DIASTOLIC BLOOD PRESSURE: 88 MMHG

## 2019-04-04 VITALS — DIASTOLIC BLOOD PRESSURE: 75 MMHG | SYSTOLIC BLOOD PRESSURE: 117 MMHG

## 2019-04-04 RX ADMIN — HYDROCODONE BITARTRATE AND ACETAMINOPHEN PRN EACH: 5; 325 TABLET ORAL at 02:57

## 2019-04-04 RX ADMIN — AMITRIPTYLINE HYDROCHLORIDE SCH MG: 25 TABLET, FILM COATED ORAL at 09:29

## 2019-04-04 RX ADMIN — POLYETHYLENE GLYCOL 3350 SCH GM: 17 POWDER, FOR SOLUTION ORAL at 09:30

## 2019-04-04 RX ADMIN — GABAPENTIN SCH MG: 300 CAPSULE ORAL at 09:29

## 2019-04-04 RX ADMIN — Medication PRN MG: at 09:43

## 2019-04-04 RX ADMIN — HYDROCODONE BITARTRATE AND ACETAMINOPHEN PRN EACH: 5; 325 TABLET ORAL at 10:59

## 2019-04-04 RX ADMIN — HYDROCODONE BITARTRATE AND ACETAMINOPHEN PRN EACH: 5; 325 TABLET ORAL at 15:38

## 2019-04-04 RX ADMIN — INSULIN GLARGINE SCH UNIT: 100 INJECTION, SOLUTION SUBCUTANEOUS at 09:32

## 2019-04-04 RX ADMIN — INSULIN LISPRO PRN UNIT: 100 INJECTION, SOLUTION INTRAVENOUS; SUBCUTANEOUS at 08:02

## 2019-04-04 RX ADMIN — GABAPENTIN SCH MG: 300 CAPSULE ORAL at 14:03

## 2019-04-04 RX ADMIN — DOCUSATE SODIUM SCH MG: 100 CAPSULE, LIQUID FILLED ORAL at 09:30

## 2019-04-04 RX ADMIN — PROPRANOLOL HYDROCHLORIDE SCH MG: 20 TABLET ORAL at 09:43

## 2019-04-04 RX ADMIN — CALCIUM SCH MG: 500 TABLET ORAL at 09:30

## 2019-04-04 RX ADMIN — CYCLOBENZAPRINE HYDROCHLORIDE PRN MG: 10 TABLET, FILM COATED ORAL at 14:03

## 2019-04-04 NOTE — NUR
Physical Therapy Impression



Pt will d/c home with [facility] services today d/t difficulty with referral to

ARU. Pt reports that her  and a friend will be present to assist

her into the home and the pt declined PT offer for final session for stair

training.





Physical Therapy Goals



1: Pt to complete bed mobility wtih SBA

2: Pt to complete transfers with SBA and appropriate AD

3: Pt to ambulate 150' with SBA and appropriate AD

4: Pt to asc/desc 6 stairs with railing and SBA

5: Pt to I)ly derrek/doff brace.



Patient's Goals

## 2019-04-04 NOTE — HOSPITALIST PROGRESS NOTE
Subjective


Progress Notes


Subjective


She has no complaints this morning. She had no acute events overnight.





Patient Complains of:


Cardiovascular:  No: Chest Pain


Respiratory:  No: Shortness of Breath





Physical Exam





Vital Signs








  Date Time  Temp Pulse Resp B/P (MAP) Pulse Ox O2 Delivery O2 Flow Rate FiO2


 


4/4/19 07:28 97.9 95 18 99/83 (88) 93 Nasal Cannula 1.0 














Intake and Output 


 


 4/4/19





 07:00


 


Intake Total 1520 ml


 


Balance 1520 ml


 


 


 


Intake Oral 1520 ml


 


# Voids 7


 


# Bowel Movements 3








General Appearance:  Alert, Awake, No Acute Distress, Afebrile


Neuro:  No Gross deficits


Cardiovascular:  Regular Rate and Rhythm


Respiratory:  No Respiratory Distress, Clear to Auscultation


GI:  Soft and Non-Tender


Extremities:  Warm, Perfused, Edema (1+pitting edema bilaterally)


Psych:  Alert & Oriented X3





Assessment and Plan


Problems:  


(1) Spinal stenosis


Assessment & Plan:  Post operative, management per primary. Will increase her 


Gabapentin dose to 900mg TID to help with neuropathic pain post-operatively. 





(2) HTN (hypertension)


Assessment & Plan:  Hold home lisinopril, spironolactone. Will continue to hold 


BP medications. She received IV fluids 3/28 to help with hypotension and 


lightheadedness. BP now wnl.





(3) DM (diabetes mellitus)


Assessment & Plan:  Decrease insulin to 20U Lantus BID, accuchecks achs, SSI 


level 2. Restart Januvia and Metformin. 





(4) RLS (restless legs syndrome)


Status:  Chronic


Assessment & Plan:  Continue chronic Mirapex.





(5) Dysuria


Status:  Acute


Assessment & Plan:  She did have complaints of burning with urination. 


Urinalysis looks like contaminated sample. Will culture results. She will be 


given as needed Pyridium for pain until culture confirmed. She has recently been


treated for UTI twice. 








Exam


Sepsis Risk:  No Definite Risk





Problem Qualifiers





(1) HTN (hypertension):  


Hypertension type:  essential hypertension  Qualified Codes:  I10 - Essential 


(primary) hypertension


(2) DM (diabetes mellitus):  


Diabetes mellitus type:  type 2  Diabetes mellitus long term insulin use:  with 


long term use  Diabetes mellitus complication status:  without complication  


Qualified Codes:  E11.9 - Type 2 diabetes mellitus without complications; Z79.4 


- Long term (current) use of insulin








JONG PANDYA             Apr 4, 2019 09:15